# Patient Record
Sex: MALE | URBAN - METROPOLITAN AREA
[De-identification: names, ages, dates, MRNs, and addresses within clinical notes are randomized per-mention and may not be internally consistent; named-entity substitution may affect disease eponyms.]

---

## 2022-11-29 ENCOUNTER — CLAIMS CREATED FROM THE CLAIM WINDOW (OUTPATIENT)
Dept: URBAN - METROPOLITAN AREA CLINIC 35 | Facility: CLINIC | Age: 52
End: 2022-11-29
Payer: COMMERCIAL

## 2022-11-29 VITALS
BODY MASS INDEX: 23.49 KG/M2 | DIASTOLIC BLOOD PRESSURE: 84 MMHG | HEART RATE: 69 BPM | SYSTOLIC BLOOD PRESSURE: 120 MMHG | OXYGEN SATURATION: 98 % | HEIGHT: 65 IN | WEIGHT: 141 LBS

## 2022-11-29 DIAGNOSIS — K59.1 FUNCTIONAL DIARRHEA: ICD-10-CM

## 2022-11-29 DIAGNOSIS — Z12.11 ENCOUNTER FOR SCREENING FOR MALIGNANT NEOPLASM OF COLON: ICD-10-CM

## 2022-11-29 DIAGNOSIS — Z12.12 ENCOUNTER FOR SCREENING FOR MALIGNANT NEOPLASM OF RECTUM: ICD-10-CM

## 2022-11-29 PROCEDURE — 99204 OFFICE O/P NEW MOD 45 MIN: CPT | Performed by: PHYSICIAN ASSISTANT

## 2022-11-29 PROCEDURE — 99204 OFFICE O/P NEW MOD 45 MIN: CPT | Performed by: INTERNAL MEDICINE

## 2022-11-29 RX ORDER — SODIUM, POTASSIUM,MAG SULFATES 17.5-3.13G
177ML SOLUTION, RECONSTITUTED, ORAL ORAL
Qty: 1 | Refills: 0 | OUTPATIENT
Start: 2022-11-29 | End: 2022-12-01

## 2022-11-29 NOTE — HPI-COLONOSCOPY SCREENING
53 y/o male patient presents today for a colorectal cancer screening. Patient denies this will be his first colonoscopy. His last colonoscopy was in 1995 due to diarrhea, with normal findings. He denies a family history of colon, gastric, or esophageal cancer/polyps. Currently reports 2-3 bowel movements per day without strain. His stools are loose without blood, mucus, or melena. He admits occasional episodes of rectal pain and pruritus ani. He states certain foods/ drinks cause pruritus ani.

## 2022-11-29 NOTE — PHYSICAL EXAM HENT:
Head, normocephalic, atraumatic, Face, Face within normal limits, External ears within normal limits, External nose  normal appearance 67

## 2022-11-29 NOTE — HPI-DIARRHEA
Patient admits to diarrhea, and had a colonoscopy in 1995 which was normal.  He has lactose intolerance, and notices if he stays away from lactose, his bowel movements will be better.  Higher fiber foods also make his stools looser.  He has never been checked for Celiac disease.  He denies nocturnal diarrhea, no abd pain, weight loss.

## 2022-12-01 ENCOUNTER — CLAIMS CREATED FROM THE CLAIM WINDOW (OUTPATIENT)
Dept: URBAN - METROPOLITAN AREA CLINIC 4 | Facility: CLINIC | Age: 52
End: 2022-12-01
Payer: COMMERCIAL

## 2022-12-01 ENCOUNTER — OFFICE VISIT (OUTPATIENT)
Dept: URBAN - METROPOLITAN AREA SURGERY CENTER 8 | Facility: SURGERY CENTER | Age: 52
End: 2022-12-01
Payer: COMMERCIAL

## 2022-12-01 DIAGNOSIS — Z12.11 COLON CANCER SCREENING: ICD-10-CM

## 2022-12-01 DIAGNOSIS — D12.2 ADENOMA OF ASCENDING COLON: ICD-10-CM

## 2022-12-01 DIAGNOSIS — D12.2 BENIGN NEOPLASM OF ASCENDING COLON: ICD-10-CM

## 2022-12-01 PROCEDURE — 88305 TISSUE EXAM BY PATHOLOGIST: CPT | Performed by: PATHOLOGY

## 2022-12-01 PROCEDURE — G8907 PT DOC NO EVENTS ON DISCHARG: HCPCS | Performed by: INTERNAL MEDICINE

## 2022-12-01 PROCEDURE — 45380 COLONOSCOPY AND BIOPSY: CPT | Performed by: INTERNAL MEDICINE

## 2022-12-09 LAB
IMMUNOGLOBULIN A: 370
INTERPRETATION: (no result)
TISSUE TRANSGLUTAMINASE AB, IGA: <1

## 2022-12-22 ENCOUNTER — OFFICE VISIT (OUTPATIENT)
Dept: URBAN - METROPOLITAN AREA CLINIC 35 | Facility: CLINIC | Age: 52
End: 2022-12-22

## 2023-01-06 ENCOUNTER — TELEPHONE ENCOUNTER (OUTPATIENT)
Dept: URBAN - METROPOLITAN AREA CLINIC 35 | Facility: CLINIC | Age: 53
End: 2023-01-06

## 2023-01-26 ENCOUNTER — WEB ENCOUNTER (OUTPATIENT)
Dept: URBAN - METROPOLITAN AREA CLINIC 35 | Facility: CLINIC | Age: 53
End: 2023-01-26

## 2023-01-31 ENCOUNTER — CLAIMS CREATED FROM THE CLAIM WINDOW (OUTPATIENT)
Dept: URBAN - METROPOLITAN AREA CLINIC 35 | Facility: CLINIC | Age: 53
End: 2023-01-31
Payer: COMMERCIAL

## 2023-01-31 ENCOUNTER — DASHBOARD ENCOUNTERS (OUTPATIENT)
Age: 53
End: 2023-01-31

## 2023-01-31 VITALS
DIASTOLIC BLOOD PRESSURE: 90 MMHG | HEIGHT: 65 IN | OXYGEN SATURATION: 99 % | SYSTOLIC BLOOD PRESSURE: 132 MMHG | HEART RATE: 70 BPM | WEIGHT: 137 LBS | BODY MASS INDEX: 22.82 KG/M2

## 2023-01-31 DIAGNOSIS — D12.2 ADENOMATOUS POLYP OF ASCENDING COLON: ICD-10-CM

## 2023-01-31 DIAGNOSIS — K64.0 GRADE I HEMORRHOIDS: ICD-10-CM

## 2023-01-31 DIAGNOSIS — K59.1 FUNCTIONAL DIARRHEA: ICD-10-CM

## 2023-01-31 PROCEDURE — 99214 OFFICE O/P EST MOD 30 MIN: CPT | Performed by: PHYSICIAN ASSISTANT

## 2023-01-31 NOTE — HPI-COLONOSCOPY FOLLOWUP
52 year old male patient presents today for a follow up from his colonoscopy. He denies any complications after his procedure. He currently reports 1 bowel movement per day, without strain. His stools are formed. He denies any mucus, melena or blood in stools. Denies any pruritus ani or rectal pain.   Colonoscopy report shows: - One 2 mm tubular adenoma in the ascending colon. - Non-bleeding internal hemorrhoids.

## 2024-08-27 ENCOUNTER — APPOINTMENT (RX ONLY)
Age: 54
Setting detail: DERMATOLOGY
End: 2024-08-27

## 2024-08-27 DIAGNOSIS — L81.4 OTHER MELANIN HYPERPIGMENTATION: ICD-10-CM

## 2024-08-27 DIAGNOSIS — L57.0 ACTINIC KERATOSIS: ICD-10-CM

## 2024-08-27 DIAGNOSIS — D22 MELANOCYTIC NEVI: ICD-10-CM

## 2024-08-27 DIAGNOSIS — D18.0 HEMANGIOMA: ICD-10-CM

## 2024-08-27 DIAGNOSIS — Z71.89 OTHER SPECIFIED COUNSELING: ICD-10-CM

## 2024-08-27 PROBLEM — D22.5 MELANOCYTIC NEVI OF TRUNK: Status: ACTIVE | Noted: 2024-08-27

## 2024-08-27 PROBLEM — D18.01 HEMANGIOMA OF SKIN AND SUBCUTANEOUS TISSUE: Status: ACTIVE | Noted: 2024-08-27

## 2024-08-27 PROCEDURE — 99213 OFFICE O/P EST LOW 20 MIN: CPT | Mod: 25

## 2024-08-27 PROCEDURE — 17000 DESTRUCT PREMALG LESION: CPT

## 2024-08-27 PROCEDURE — 17003 DESTRUCT PREMALG LES 2-14: CPT

## 2024-08-27 PROCEDURE — ? COUNSELING

## 2024-08-27 PROCEDURE — ? LIQUID NITROGEN

## 2024-08-27 ASSESSMENT — LOCATION DETAILED DESCRIPTION DERM
LOCATION DETAILED: NASAL SUPRATIP
LOCATION DETAILED: LEFT LATERAL EYEBROW
LOCATION DETAILED: LEFT SUPERIOR UPPER BACK
LOCATION DETAILED: LEFT MEDIAL SUPERIOR CHEST
LOCATION DETAILED: STERNUM
LOCATION DETAILED: NASAL DORSUM

## 2024-08-27 ASSESSMENT — LOCATION SIMPLE DESCRIPTION DERM
LOCATION SIMPLE: NOSE
LOCATION SIMPLE: LEFT UPPER BACK
LOCATION SIMPLE: LEFT EYEBROW
LOCATION SIMPLE: CHEST

## 2024-08-27 ASSESSMENT — LOCATION ZONE DERM
LOCATION ZONE: TRUNK
LOCATION ZONE: NOSE
LOCATION ZONE: FACE

## 2025-03-31 ENCOUNTER — APPOINTMENT (OUTPATIENT)
Age: 55
Setting detail: DERMATOLOGY
End: 2025-03-31

## 2025-03-31 DIAGNOSIS — L91.8 OTHER HYPERTROPHIC DISORDERS OF THE SKIN: ICD-10-CM

## 2025-03-31 DIAGNOSIS — L81.4 OTHER MELANIN HYPERPIGMENTATION: ICD-10-CM

## 2025-03-31 DIAGNOSIS — D22 MELANOCYTIC NEVI: ICD-10-CM

## 2025-03-31 DIAGNOSIS — L82.1 OTHER SEBORRHEIC KERATOSIS: ICD-10-CM

## 2025-03-31 PROBLEM — D22.5 MELANOCYTIC NEVI OF TRUNK: Status: ACTIVE | Noted: 2025-03-31

## 2025-03-31 PROCEDURE — 99213 OFFICE O/P EST LOW 20 MIN: CPT

## 2025-03-31 PROCEDURE — ? COUNSELING

## 2025-03-31 PROCEDURE — ? ADDITIONAL NOTES

## 2025-03-31 ASSESSMENT — LOCATION DETAILED DESCRIPTION DERM
LOCATION DETAILED: RIGHT INFERIOR UPPER BACK
LOCATION DETAILED: LEFT INFERIOR UPPER BACK
LOCATION DETAILED: RIGHT VENTRAL DISTAL FOREARM
LOCATION DETAILED: LEFT VENTRAL DISTAL FOREARM
LOCATION DETAILED: LEFT ANTERIOR PROXIMAL THIGH
LOCATION DETAILED: RIGHT MID-UPPER BACK
LOCATION DETAILED: LEFT MID-UPPER BACK

## 2025-03-31 ASSESSMENT — LOCATION SIMPLE DESCRIPTION DERM
LOCATION SIMPLE: LEFT UPPER BACK
LOCATION SIMPLE: RIGHT UPPER BACK
LOCATION SIMPLE: RIGHT FOREARM
LOCATION SIMPLE: LEFT THIGH
LOCATION SIMPLE: LEFT FOREARM

## 2025-03-31 ASSESSMENT — LOCATION ZONE DERM
LOCATION ZONE: LEG
LOCATION ZONE: ARM
LOCATION ZONE: TRUNK

## 2025-03-31 NOTE — PROCEDURE: ADDITIONAL NOTES
Detail Level: Simple
Render Risk Assessment In Note?: no
Additional Notes: - Partially removed from manipulation, rest removed today with dessication as test

## 2025-03-31 NOTE — PROCEDURE: MIPS QUALITY
Quality 47: Advance Care Plan: Advance Care Planning discussed and documented in the medical record; patient did not wish or was not able to name a surrogate decision maker or provide an advance care plan.
Detail Level: Detailed
Additional Notes: Denies covid19 vaccine
Quality 226: Preventive Care And Screening: Tobacco Use: Screening And Cessation Intervention: Patient screened for tobacco use and is an ex/non-smoker
Quality 431: Preventive Care And Screening: Unhealthy Alcohol Use - Screening: Patient not identified as an unhealthy alcohol user when screened for unhealthy alcohol use using a systematic screening method